# Patient Record
Sex: MALE | Race: WHITE | Employment: STUDENT | URBAN - METROPOLITAN AREA
[De-identification: names, ages, dates, MRNs, and addresses within clinical notes are randomized per-mention and may not be internally consistent; named-entity substitution may affect disease eponyms.]

---
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
## 2020-01-22 NOTE — PROGRESS NOTES
HPI:    Patient ID: Miguel Juarez is a 23year old male. HPI  Darryle Bracken Arvjacobo Kalpesh is a 23year old male.   HPI:   Here for f/u on meds    Overall OK w meds  Out of meds for 3 weeks   Can tell the difference       No issues w it while on Outpatient Medications   Medication Sig Dispense Refill   • ADDERALL XR 20 MG Oral Capsule SR 24 Hr Take 1 capsule (20 mg total) by mouth daily.  30 capsule 0     Allergies:No Known Allergies   PHYSICAL EXAM:   Physical Exam           ASSESSMENT/PLAN:   Att

## 2020-02-07 RX ORDER — DEXTROAMPHETAMINE SACCHARATE, AMPHETAMINE ASPARTATE MONOHYDRATE, DEXTROAMPHETAMINE SULFATE AND AMPHETAMINE SULFATE 5; 5; 5; 5 MG/1; MG/1; MG/1; MG/1
20 CAPSULE, EXTENDED RELEASE ORAL DAILY
Qty: 30 CAPSULE | Refills: 0 | Status: SHIPPED | OUTPATIENT
Start: 2020-04-21 | End: 2020-05-21

## 2020-02-07 RX ORDER — DEXTROAMPHETAMINE SACCHARATE, AMPHETAMINE ASPARTATE MONOHYDRATE, DEXTROAMPHETAMINE SULFATE AND AMPHETAMINE SULFATE 5; 5; 5; 5 MG/1; MG/1; MG/1; MG/1
20 CAPSULE, EXTENDED RELEASE ORAL DAILY
Qty: 30 CAPSULE | Refills: 0 | Status: SHIPPED | OUTPATIENT
Start: 2020-03-22 | End: 2020-04-21

## 2020-02-07 RX ORDER — DEXTROAMPHETAMINE SACCHARATE, AMPHETAMINE ASPARTATE MONOHYDRATE, DEXTROAMPHETAMINE SULFATE AND AMPHETAMINE SULFATE 5; 5; 5; 5 MG/1; MG/1; MG/1; MG/1
20 CAPSULE, EXTENDED RELEASE ORAL DAILY
Qty: 30 CAPSULE | Refills: 0 | Status: SHIPPED | OUTPATIENT
Start: 2020-02-22 | End: 2020-03-23

## 2020-03-27 ENCOUNTER — TELEPHONE (OUTPATIENT)
Dept: INTERNAL MEDICINE CLINIC | Facility: CLINIC | Age: 20
End: 2020-03-27

## 2020-03-27 NOTE — TELEPHONE ENCOUNTER
12:44 pm Spoke with Pt's mother. She is unable to write down phone numbers and names at this time because she is driving and will call us back for them. Pt can self refer thru [facility] [phone] or thru [facility] [phone].

## 2020-03-27 NOTE — TELEPHONE ENCOUNTER
Patient's mother called requesting for referral for patient to see therapist. Possibly get started on antidepressants. Please advise.

## 2020-06-01 NOTE — TELEPHONE ENCOUNTER
Refill needed [facility] #[id number]    Due for OV July - mother will call to schedule at later time    ADDERALL XR 20 MG Oral Capsule SR 24 Hr

## 2020-06-04 NOTE — TELEPHONE ENCOUNTER
ADDERALL XR 20 MG Oral Capsule SR 24 Hr    LOV: 1/22/2020   RTC: 6 months   Upcoming OV: none scheduled   Filled: 1/22/2020 #30, 0 refills

## 2020-06-05 RX ORDER — DEXTROAMPHETAMINE SULFATE, DEXTROAMPHETAMINE SACCHARATE, AMPHETAMINE SULFATE AND AMPHETAMINE ASPARTATE 5; 5; 5; 5 MG/1; MG/1; MG/1; MG/1
20 CAPSULE, EXTENDED RELEASE ORAL DAILY
Qty: 30 CAPSULE | Refills: 0 | Status: SHIPPED | OUTPATIENT
Start: 2020-06-05 | End: 2020-07-31

## 2020-07-31 RX ORDER — DEXTROAMPHETAMINE SULFATE, DEXTROAMPHETAMINE SACCHARATE, AMPHETAMINE SULFATE AND AMPHETAMINE ASPARTATE 5; 5; 5; 5 MG/1; MG/1; MG/1; MG/1
20 CAPSULE, EXTENDED RELEASE ORAL DAILY
Qty: 30 CAPSULE | Refills: 0 | Status: SHIPPED | OUTPATIENT
Start: 2020-07-31 | End: 2020-08-21

## 2020-07-31 NOTE — TELEPHONE ENCOUNTER
Mom called and is requesting a refill on her son's prescription for ADDERALL XR 20 MG Oral Capsule SR 24 Hr. Needs to be sent to [address] [place] [address] [address], [address] [facility]  [address] [phone], [phone]. Please advise.

## 2020-08-21 NOTE — PROGRESS NOTES
Erick Nazario is a 21year old male.   HPI:   Patient has agreed to do a doximity video encounter w me today in lieu of a regular appointment in regards to his ADD   Taking the med daily    Doing well w it    Has noticed that at work that he get

## 2020-09-08 ENCOUNTER — TELEPHONE (OUTPATIENT)
Dept: INTERNAL MEDICINE CLINIC | Facility: CLINIC | Age: 20
End: 2020-09-08

## 2020-09-08 DIAGNOSIS — Z20.828 EXPOSURE TO SARS-ASSOCIATED CORONAVIRUS: Primary | ICD-10-CM

## 2020-09-08 NOTE — TELEPHONE ENCOUNTER
Pt's father requesting COVID testing for pt. Pt's mother started with symptoms last Thursday and received positive results yesterday. Father being tested today and requesting order be placed for pt today. Pt with no current symptoms of COVID.

## 2020-09-08 NOTE — TELEPHONE ENCOUNTER
Pt's father aware test placed in the system. 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

## 2020-09-08 NOTE — TELEPHONE ENCOUNTER
Since Pt has no symptoms, it would be ok to work as long as he wears his mask, wash hands, social distance etc.

## 2020-09-09 ENCOUNTER — APPOINTMENT (OUTPATIENT)
Dept: LAB | Age: 20
End: 2020-09-09
Attending: FAMILY MEDICINE
Payer: COMMERCIAL

## 2020-09-09 DIAGNOSIS — Z20.828 EXPOSURE TO SARS-ASSOCIATED CORONAVIRUS: ICD-10-CM

## 2020-09-12 ENCOUNTER — TELEPHONE (OUTPATIENT)
Dept: FAMILY MEDICINE CLINIC | Facility: CLINIC | Age: 20
End: 2020-09-12

## 2020-09-12 NOTE — TELEPHONE ENCOUNTER
Received call from patient's mother requesting COVID test results. Discussed results are not available yet, test is still in progress. Mother wanted to know work status.  I instructed if she and her  are COVID (+) and patient is living in home with t

## 2020-09-14 ENCOUNTER — TELEPHONE (OUTPATIENT)
Dept: INTERNAL MEDICINE CLINIC | Facility: CLINIC | Age: 20
End: 2020-09-14

## 2020-09-14 LAB — SARS-COV-2 BY PCR: NOT DETECTED

## 2020-09-14 NOTE — TELEPHONE ENCOUNTER
Patient's mother calling to ask if Azell Landau should get re test for COVID 19; within last week the entire household has tested positive for virus.  His test was taken 14 days ago and just came back negative result; should he retest? Should he be going to work wi

## 2020-09-14 NOTE — TELEPHONE ENCOUNTER
Spoke with patient's mother okay per HIPAA stating patient's work asking him to return to work, but he is living-isolating in a house full of people positive with COVID. Patient does not have any symptoms at this time. Please advise.

## 2020-09-14 NOTE — TELEPHONE ENCOUNTER
Spoke with patient's mother ok per HIPAA-notified patient should not be working at this time, no need for retesting. Mother verbalized understanding.

## 2020-09-14 NOTE — TELEPHONE ENCOUNTER
Left message on pt's mother ph to call our office back. Dr Larissa Pollock- would pt needs to be retested?

## 2020-09-15 NOTE — TELEPHONE ENCOUNTER
Spoke with patient's mother Antonio adams per HIPAA-asking when patient can return to work-he needs to provide return date to his work. Patient tested negative for COVID and is asymptomatic, but his siblings and mother are + for covid. Please advise.

## 2020-09-16 NOTE — TELEPHONE ENCOUNTER
LM on VM for patient's mother Gage adams per HIPAA-notified pt can go back tomorrow if still remains without s/s.  Gage Landry to call back with any further questions

## 2020-10-12 RX ORDER — DEXTROAMPHETAMINE SULFATE, DEXTROAMPHETAMINE SACCHARATE, AMPHETAMINE SULFATE AND AMPHETAMINE ASPARTATE 5; 5; 5; 5 MG/1; MG/1; MG/1; MG/1
CAPSULE, EXTENDED RELEASE ORAL
Qty: 30 CAPSULE | Refills: 0 | Status: SHIPPED | OUTPATIENT
Start: 2020-10-12 | End: 2020-10-23

## 2020-10-12 NOTE — TELEPHONE ENCOUNTER
Medication(s) to Refill:   Requested Prescriptions     Pending Prescriptions Disp Refills   • ADDERALL XR 20 MG Oral Capsule SR 24 Hr [Pharmacy Med Name: Adderall XR Oral Capsule Extended Release 24 Hour 20 MG] 30 capsule 0     Sig: TAKE 1 CAPSULE BY MOUTH

## 2020-10-23 RX ORDER — DEXTROAMPHETAMINE SULFATE, DEXTROAMPHETAMINE SACCHARATE, AMPHETAMINE SULFATE AND AMPHETAMINE ASPARTATE 5; 5; 5; 5 MG/1; MG/1; MG/1; MG/1
20 CAPSULE, EXTENDED RELEASE ORAL DAILY
Qty: 30 CAPSULE | Refills: 0 | Status: SHIPPED | OUTPATIENT
Start: 2020-10-23 | End: 2021-01-19

## 2020-10-23 NOTE — TELEPHONE ENCOUNTER
Pt's mother stated pt was only given a 30 day supply for the adderall on pt's last virtual visit, pt's mother stated it should off been for 90 days, she is asking if dr. Radha Alcaraz can please send a 80 day supply,  Pt's mother did make an appointment but thinks

## 2020-11-02 ENCOUNTER — TELEPHONE (OUTPATIENT)
Dept: INTERNAL MEDICINE CLINIC | Facility: CLINIC | Age: 20
End: 2020-11-02

## 2020-11-15 ENCOUNTER — IMMUNIZATION (OUTPATIENT)
Dept: URGENT CARE | Age: 20
End: 2020-11-15

## 2020-11-15 DIAGNOSIS — Z23 NEED FOR VACCINATION: Primary | ICD-10-CM

## 2020-11-15 PROCEDURE — X1165 SELF PAY INFLUENZA QUADRIVALENT SPLIT PRES FREE .5 ML VACCINE: HCPCS | Performed by: NURSE PRACTITIONER

## 2021-01-19 ENCOUNTER — OFFICE VISIT (OUTPATIENT)
Dept: INTERNAL MEDICINE CLINIC | Facility: CLINIC | Age: 21
End: 2021-01-19

## 2021-01-19 VITALS
DIASTOLIC BLOOD PRESSURE: 80 MMHG | HEIGHT: 70.75 IN | WEIGHT: 202 LBS | BODY MASS INDEX: 28.28 KG/M2 | SYSTOLIC BLOOD PRESSURE: 138 MMHG | OXYGEN SATURATION: 99 % | HEART RATE: 100 BPM | RESPIRATION RATE: 16 BRPM

## 2021-01-19 DIAGNOSIS — Z00.00 ROUTINE GENERAL MEDICAL EXAMINATION AT A HEALTH CARE FACILITY: Primary | ICD-10-CM

## 2021-01-19 DIAGNOSIS — F41.9 ANXIETY: ICD-10-CM

## 2021-01-19 DIAGNOSIS — F90.9 ATTENTION DEFICIT HYPERACTIVITY DISORDER (ADHD), UNSPECIFIED ADHD TYPE: ICD-10-CM

## 2021-01-19 DIAGNOSIS — Z23 NEED FOR TDAP VACCINATION: ICD-10-CM

## 2021-01-19 PROCEDURE — 99213 OFFICE O/P EST LOW 20 MIN: CPT | Performed by: NURSE PRACTITIONER

## 2021-01-19 PROCEDURE — 3079F DIAST BP 80-89 MM HG: CPT | Performed by: NURSE PRACTITIONER

## 2021-01-19 PROCEDURE — 99395 PREV VISIT EST AGE 18-39: CPT | Performed by: NURSE PRACTITIONER

## 2021-01-19 PROCEDURE — 3075F SYST BP GE 130 - 139MM HG: CPT | Performed by: NURSE PRACTITIONER

## 2021-01-19 PROCEDURE — 3008F BODY MASS INDEX DOCD: CPT | Performed by: NURSE PRACTITIONER

## 2021-01-19 RX ORDER — DEXTROAMPHETAMINE SACCHARATE, AMPHETAMINE ASPARTATE MONOHYDRATE, DEXTROAMPHETAMINE SULFATE AND AMPHETAMINE SULFATE 5; 5; 5; 5 MG/1; MG/1; MG/1; MG/1
20 CAPSULE, EXTENDED RELEASE ORAL DAILY
Qty: 30 CAPSULE | Refills: 0 | Status: SHIPPED | OUTPATIENT
Start: 2021-02-19 | End: 2021-03-21

## 2021-01-19 RX ORDER — DEXTROAMPHETAMINE SACCHARATE, AMPHETAMINE ASPARTATE MONOHYDRATE, DEXTROAMPHETAMINE SULFATE AND AMPHETAMINE SULFATE 5; 5; 5; 5 MG/1; MG/1; MG/1; MG/1
20 CAPSULE, EXTENDED RELEASE ORAL DAILY
Qty: 30 CAPSULE | Refills: 0 | Status: SHIPPED | OUTPATIENT
Start: 2021-01-19 | End: 2021-02-18

## 2021-01-19 RX ORDER — DEXTROAMPHETAMINE SACCHARATE, AMPHETAMINE ASPARTATE MONOHYDRATE, DEXTROAMPHETAMINE SULFATE AND AMPHETAMINE SULFATE 5; 5; 5; 5 MG/1; MG/1; MG/1; MG/1
20 CAPSULE, EXTENDED RELEASE ORAL DAILY
Qty: 30 CAPSULE | Refills: 0 | Status: SHIPPED | OUTPATIENT
Start: 2021-03-22 | End: 2021-04-21

## 2021-01-19 NOTE — PROGRESS NOTES
Yissel Martin is a 21year old male who presents for a complete physical exam.   HPI:   Pt complains of Patient returns for recheck of ADHD treatment. Has been using the stimulant medication on a regular basis.  Feels the medication has been hel 04/17/2000 07/25/2000 09/14/2004 07/08/2009      MMR                   06/15/2001  09/14/2004      Meningococcal-Menactra                          10/16/2017      Pneumococcal (Prevnar 13)                          07/05/2000 watch     REVIEW OF SYSTEMS:   GENERAL: feels well otherwise  SKIN: denies any new unusual skin lesions +history of tinea, not bothersome  EYES: + wears glasses   HEENT: denies nasal congestion, sinus pain or ST  LUNGS: denies shortness of breath with exer in 1 year.

## 2021-04-01 ENCOUNTER — IMMUNIZATION (OUTPATIENT)
Dept: LAB | Age: 21
End: 2021-04-01
Attending: HOSPITALIST
Payer: COMMERCIAL

## 2021-04-01 DIAGNOSIS — Z23 NEED FOR VACCINATION: Primary | ICD-10-CM

## 2021-04-01 PROCEDURE — 0001A SARSCOV2 VAC 30MCG/0.3ML IM: CPT

## 2021-04-22 ENCOUNTER — IMMUNIZATION (OUTPATIENT)
Dept: LAB | Age: 21
End: 2021-04-22
Attending: HOSPITALIST
Payer: COMMERCIAL

## 2021-04-22 DIAGNOSIS — Z23 NEED FOR VACCINATION: Primary | ICD-10-CM

## 2021-04-22 PROCEDURE — 0002A SARSCOV2 VAC 30MCG/0.3ML IM: CPT

## 2021-05-04 ENCOUNTER — OFFICE VISIT (OUTPATIENT)
Dept: INTERNAL MEDICINE CLINIC | Facility: CLINIC | Age: 21
End: 2021-05-04

## 2021-05-04 ENCOUNTER — TELEPHONE (OUTPATIENT)
Dept: INTERNAL MEDICINE CLINIC | Facility: CLINIC | Age: 21
End: 2021-05-04

## 2021-05-04 VITALS
BODY MASS INDEX: 28.94 KG/M2 | WEIGHT: 206.75 LBS | SYSTOLIC BLOOD PRESSURE: 126 MMHG | RESPIRATION RATE: 18 BRPM | DIASTOLIC BLOOD PRESSURE: 68 MMHG | HEART RATE: 115 BPM | TEMPERATURE: 98 F | HEIGHT: 70.75 IN | OXYGEN SATURATION: 99 %

## 2021-05-04 DIAGNOSIS — E66.3 OVERWEIGHT (BMI 25.0-29.9): Primary | ICD-10-CM

## 2021-05-04 DIAGNOSIS — F41.9 ANXIETY: ICD-10-CM

## 2021-05-04 DIAGNOSIS — F90.9 ATTENTION DEFICIT HYPERACTIVITY DISORDER (ADHD), UNSPECIFIED ADHD TYPE: ICD-10-CM

## 2021-05-04 DIAGNOSIS — Z11.3 ROUTINE SCREENING FOR STI (SEXUALLY TRANSMITTED INFECTION): ICD-10-CM

## 2021-05-04 DIAGNOSIS — Z23 NEED FOR HPV VACCINE: ICD-10-CM

## 2021-05-04 PROCEDURE — 3008F BODY MASS INDEX DOCD: CPT | Performed by: NURSE PRACTITIONER

## 2021-05-04 PROCEDURE — 3074F SYST BP LT 130 MM HG: CPT | Performed by: NURSE PRACTITIONER

## 2021-05-04 PROCEDURE — 99215 OFFICE O/P EST HI 40 MIN: CPT | Performed by: NURSE PRACTITIONER

## 2021-05-04 PROCEDURE — 3078F DIAST BP <80 MM HG: CPT | Performed by: NURSE PRACTITIONER

## 2021-05-04 RX ORDER — DEXTROAMPHETAMINE SACCHARATE, AMPHETAMINE ASPARTATE MONOHYDRATE, DEXTROAMPHETAMINE SULFATE AND AMPHETAMINE SULFATE 6.25; 6.25; 6.25; 6.25 MG/1; MG/1; MG/1; MG/1
25 CAPSULE, EXTENDED RELEASE ORAL EVERY MORNING
Qty: 30 CAPSULE | Refills: 0 | Status: SHIPPED | OUTPATIENT
Start: 2021-05-04 | End: 2021-06-08

## 2021-05-04 NOTE — PROGRESS NOTES
CHIEF COMPLAINT:     Patient presents with:  Medication Follow-Up  Weight Problem      HPI:   Alejandra Peraza is a 24year old male returns for recheck of ADD treatment. Has been using the stimulant medication on a regular basis.  Feels the medica Diagnosis Date   • Asperger syndrome       Social History:  Social History    Tobacco Use      Smoking status: Never Smoker      Smokeless tobacco: Never Used    Vaping Use      Vaping Use: Never used    Alcohol use: Not Currently      Alcohol/week: 0.0 type  - Amphetamine-Dextroamphet ER (ADDERALL XR) 25 MG Oral Capsule SR 24 Hr; Take 1 capsule (25 mg total) by mouth every morning. Dispense: 30 capsule; Refill: 0  Monitor HR.  Have mom who is an RN check HR and send message in the next month with reading

## 2021-05-04 NOTE — TELEPHONE ENCOUNTER
Pt called and scheduled appointment. Pt is out of medication.      Amphetamine-Dextroamphet ER (ADDERALL XR) 20 MG Oral Capsule SR 24 Hr 30 capsule         Future Appointments   Date Time Provider Heather Coulter   5/4/2021  1:00 PM Kassie Merchant

## 2021-05-30 DIAGNOSIS — F90.9 ATTENTION DEFICIT HYPERACTIVITY DISORDER (ADHD), UNSPECIFIED ADHD TYPE: ICD-10-CM

## 2021-06-01 RX ORDER — DEXTROAMPHETAMINE SACCHARATE, AMPHETAMINE ASPARTATE MONOHYDRATE, DEXTROAMPHETAMINE SULFATE AND AMPHETAMINE SULFATE 6.25; 6.25; 6.25; 6.25 MG/1; MG/1; MG/1; MG/1
25 CAPSULE, EXTENDED RELEASE ORAL EVERY MORNING
Qty: 30 CAPSULE | Refills: 0 | OUTPATIENT
Start: 2021-06-03 | End: 2021-07-03

## 2021-06-01 NOTE — TELEPHONE ENCOUNTER
Failed protocol     Last refill:  Amphetamine-Dextroamphet ER (ADDERALL XR) 25 MG Oral Capsule SR 24 Hr 30 capsule 0 5/4/2021    Sig:   Take 1 capsule (25 mg total) by mouth every morning. LOV:  5/4/2021 Janelle Adams RTC 3 months  4.  Attention def

## 2021-06-01 NOTE — TELEPHONE ENCOUNTER
Please have pt f/u regarding HR or reach out to discuss with mom if HR has been monitored as per last note.

## 2021-06-08 ENCOUNTER — OFFICE VISIT (OUTPATIENT)
Dept: INTERNAL MEDICINE CLINIC | Facility: CLINIC | Age: 21
End: 2021-06-08

## 2021-06-08 VITALS
BODY MASS INDEX: 27.13 KG/M2 | TEMPERATURE: 98 F | WEIGHT: 189.5 LBS | RESPIRATION RATE: 16 BRPM | DIASTOLIC BLOOD PRESSURE: 68 MMHG | HEIGHT: 70 IN | HEART RATE: 98 BPM | SYSTOLIC BLOOD PRESSURE: 116 MMHG

## 2021-06-08 DIAGNOSIS — F90.9 ATTENTION DEFICIT HYPERACTIVITY DISORDER (ADHD), UNSPECIFIED ADHD TYPE: ICD-10-CM

## 2021-06-08 PROCEDURE — 3074F SYST BP LT 130 MM HG: CPT | Performed by: NURSE PRACTITIONER

## 2021-06-08 PROCEDURE — 3008F BODY MASS INDEX DOCD: CPT | Performed by: NURSE PRACTITIONER

## 2021-06-08 PROCEDURE — 3078F DIAST BP <80 MM HG: CPT | Performed by: NURSE PRACTITIONER

## 2021-06-08 PROCEDURE — 99213 OFFICE O/P EST LOW 20 MIN: CPT | Performed by: NURSE PRACTITIONER

## 2021-06-08 RX ORDER — DEXTROAMPHETAMINE SACCHARATE, AMPHETAMINE ASPARTATE MONOHYDRATE, DEXTROAMPHETAMINE SULFATE AND AMPHETAMINE SULFATE 6.25; 6.25; 6.25; 6.25 MG/1; MG/1; MG/1; MG/1
25 CAPSULE, EXTENDED RELEASE ORAL DAILY
Qty: 30 CAPSULE | Refills: 0 | Status: SHIPPED | OUTPATIENT
Start: 2021-06-08 | End: 2021-07-08

## 2021-06-08 RX ORDER — DEXTROAMPHETAMINE SACCHARATE, AMPHETAMINE ASPARTATE MONOHYDRATE, DEXTROAMPHETAMINE SULFATE AND AMPHETAMINE SULFATE 6.25; 6.25; 6.25; 6.25 MG/1; MG/1; MG/1; MG/1
25 CAPSULE, EXTENDED RELEASE ORAL EVERY MORNING
Qty: 30 CAPSULE | Refills: 0 | Status: CANCELLED | OUTPATIENT
Start: 2021-06-08

## 2021-06-08 RX ORDER — DEXTROAMPHETAMINE SACCHARATE, AMPHETAMINE ASPARTATE MONOHYDRATE, DEXTROAMPHETAMINE SULFATE AND AMPHETAMINE SULFATE 6.25; 6.25; 6.25; 6.25 MG/1; MG/1; MG/1; MG/1
25 CAPSULE, EXTENDED RELEASE ORAL DAILY
Qty: 30 CAPSULE | Refills: 0 | Status: SHIPPED | OUTPATIENT
Start: 2021-07-09 | End: 2021-08-09

## 2021-06-08 RX ORDER — DEXTROAMPHETAMINE SACCHARATE, AMPHETAMINE ASPARTATE MONOHYDRATE, DEXTROAMPHETAMINE SULFATE AND AMPHETAMINE SULFATE 6.25; 6.25; 6.25; 6.25 MG/1; MG/1; MG/1; MG/1
25 CAPSULE, EXTENDED RELEASE ORAL DAILY
Qty: 30 CAPSULE | Refills: 0 | Status: SHIPPED | OUTPATIENT
Start: 2021-08-09 | End: 2021-08-09

## 2021-06-08 NOTE — PROGRESS NOTES
CHIEF COMPLAINT:     Patient presents with:  Medication Follow-Up: Refills needed. Pt is requesting lower dose of the 25 mg      HPI:   Trang Molina is a 24year old male who returns for recheck of ADHD treatment.  Dose increased from 20 mg to GENERAL: Denies fever, chills, weight change, decreased appetite  EYES: Denies blurred vision or double vision  CHEST: Denies chest pain, or palpitations  LUNGS: Denies shortness of breath, cough, or wheezing  GI: Denies abdominal pain, N/V/C/D.    NEURO:

## 2021-07-04 ENCOUNTER — HOSPITAL ENCOUNTER (EMERGENCY)
Facility: HOSPITAL | Age: 21
Discharge: HOME | End: 2021-07-04
Attending: EMERGENCY MEDICINE
Payer: COMMERCIAL

## 2021-07-04 ENCOUNTER — APPOINTMENT (OUTPATIENT)
Dept: CT IMAGING | Facility: HOSPITAL | Age: 21
End: 2021-07-04
Attending: EMERGENCY MEDICINE
Payer: COMMERCIAL

## 2021-07-04 VITALS
SYSTOLIC BLOOD PRESSURE: 154 MMHG | DIASTOLIC BLOOD PRESSURE: 89 MMHG | HEART RATE: 92 BPM | BODY MASS INDEX: 25.76 KG/M2 | RESPIRATION RATE: 16 BRPM | OXYGEN SATURATION: 99 % | HEIGHT: 71 IN | TEMPERATURE: 98 F | WEIGHT: 184 LBS

## 2021-07-04 DIAGNOSIS — R20.2 PARESTHESIAS: Primary | ICD-10-CM

## 2021-07-04 LAB
ALBUMIN SERPL-MCNC: 4.5 G/DL (ref 3.4–5)
ALBUMIN/GLOB SERPL: 1.5 {RATIO} (ref 1–2)
ALP LIVER SERPL-CCNC: 70 U/L
ALT SERPL-CCNC: 33 U/L
ANION GAP SERPL CALC-SCNC: 5 MMOL/L (ref 0–18)
AST SERPL-CCNC: 20 U/L (ref 15–37)
BASOPHILS # BLD AUTO: 0.01 X10(3) UL (ref 0–0.2)
BASOPHILS NFR BLD AUTO: 0.3 %
BILIRUB SERPL-MCNC: 0.6 MG/DL (ref 0.1–2)
BUN BLD-MCNC: 12 MG/DL (ref 7–18)
BUN/CREAT SERPL: 13.8 (ref 10–20)
CALCIUM BLD-MCNC: 9 MG/DL (ref 8.5–10.1)
CHLORIDE SERPL-SCNC: 107 MMOL/L (ref 98–112)
CO2 SERPL-SCNC: 27 MMOL/L (ref 21–32)
CREAT BLD-MCNC: 0.87 MG/DL
DEPRECATED RDW RBC AUTO: 37.9 FL (ref 35.1–46.3)
EOSINOPHIL # BLD AUTO: 0.05 X10(3) UL (ref 0–0.7)
EOSINOPHIL NFR BLD AUTO: 1.6 %
ERYTHROCYTE [DISTWIDTH] IN BLOOD BY AUTOMATED COUNT: 12.2 % (ref 11–15)
GLOBULIN PLAS-MCNC: 3.1 G/DL (ref 2.8–4.4)
GLUCOSE BLD-MCNC: 102 MG/DL (ref 70–99)
HCT VFR BLD AUTO: 45.9 %
HGB BLD-MCNC: 16.3 G/DL
IMM GRANULOCYTES # BLD AUTO: 0 X10(3) UL (ref 0–1)
IMM GRANULOCYTES NFR BLD: 0 %
LYMPHOCYTES # BLD AUTO: 1.05 X10(3) UL (ref 1–4)
LYMPHOCYTES NFR BLD AUTO: 32.9 %
M PROTEIN MFR SERPL ELPH: 7.6 G/DL (ref 6.4–8.2)
MCH RBC QN AUTO: 30.5 PG (ref 26–34)
MCHC RBC AUTO-ENTMCNC: 35.5 G/DL (ref 31–37)
MCV RBC AUTO: 85.8 FL
MONOCYTES # BLD AUTO: 0.28 X10(3) UL (ref 0.1–1)
MONOCYTES NFR BLD AUTO: 8.8 %
NEUTROPHILS # BLD AUTO: 1.8 X10 (3) UL (ref 1.5–7.7)
NEUTROPHILS # BLD AUTO: 1.8 X10(3) UL (ref 1.5–7.7)
NEUTROPHILS NFR BLD AUTO: 56.4 %
OSMOLALITY SERPL CALC.SUM OF ELEC: 288 MOSM/KG (ref 275–295)
PLATELET # BLD AUTO: 230 10(3)UL (ref 150–450)
POTASSIUM SERPL-SCNC: 3.6 MMOL/L (ref 3.5–5.1)
RBC # BLD AUTO: 5.35 X10(6)UL
SODIUM SERPL-SCNC: 139 MMOL/L (ref 136–145)
WBC # BLD AUTO: 3.2 X10(3) UL (ref 4–11)

## 2021-07-04 PROCEDURE — 93005 ELECTROCARDIOGRAM TRACING: CPT

## 2021-07-04 PROCEDURE — 99285 EMERGENCY DEPT VISIT HI MDM: CPT

## 2021-07-04 PROCEDURE — 80053 COMPREHEN METABOLIC PANEL: CPT | Performed by: EMERGENCY MEDICINE

## 2021-07-04 PROCEDURE — 85025 COMPLETE CBC W/AUTO DIFF WBC: CPT | Performed by: EMERGENCY MEDICINE

## 2021-07-04 PROCEDURE — 93010 ELECTROCARDIOGRAM REPORT: CPT

## 2021-07-04 PROCEDURE — 96374 THER/PROPH/DIAG INJ IV PUSH: CPT

## 2021-07-04 PROCEDURE — 70450 CT HEAD/BRAIN W/O DYE: CPT | Performed by: EMERGENCY MEDICINE

## 2021-07-04 PROCEDURE — 96375 TX/PRO/DX INJ NEW DRUG ADDON: CPT

## 2021-07-04 PROCEDURE — 96361 HYDRATE IV INFUSION ADD-ON: CPT

## 2021-07-04 RX ORDER — METOCLOPRAMIDE HYDROCHLORIDE 5 MG/ML
10 INJECTION INTRAMUSCULAR; INTRAVENOUS ONCE
Status: COMPLETED | OUTPATIENT
Start: 2021-07-04 | End: 2021-07-04

## 2021-07-04 RX ORDER — ASPIRIN 81 MG/1
81 TABLET, CHEWABLE ORAL ONCE
Status: COMPLETED | OUTPATIENT
Start: 2021-07-04 | End: 2021-07-04

## 2021-07-04 RX ORDER — DIPHENHYDRAMINE HYDROCHLORIDE 50 MG/ML
25 INJECTION INTRAMUSCULAR; INTRAVENOUS ONCE
Status: COMPLETED | OUTPATIENT
Start: 2021-07-04 | End: 2021-07-04

## 2021-07-04 NOTE — ED PROVIDER NOTES
Patient Seen in: [facility] Emergency Department      History   Patient presents with:  Numbness Weakness    Stated Complaint: numbness in left hand that started this morning.  took a higher dose of ADHD med*    HPI/Subjective:   HPI    This is a 24- Exam  General: . Patient seems slightly anxious. He has no pronator drift he has no facial asymmetry he has normal finger-to-nose his cranial nerves grossly intact.   He has subjective sense sensation of feeling tingling in his lower extremities but he is CULTURE REFLEX   RAINBOW DRAW LAVENDER   RAINBOW DRAW LIGHT GREEN   RAINBOW DRAW GOLD               The patient was placed on monitors, IV was started, blood was drawn.          MDM      Clinically I do feel that this in a score is 0 as he has subjective nu 3.2.  The patient comprehensive was grossly normal.  The patient did get Reglan, Benadryl, IV fluids. The patient was reexamined at 1:15 PM.      On repeat exam his symptoms are completely resolved.   The patient had I was given Reglan Benadryl and p

## 2021-07-04 NOTE — ED INITIAL ASSESSMENT (HPI)
PT reports feeling numb below L knee and L arm around 1130. PT also reports feeling dizzy and numbness went from L arm to R arm. Currently PT reports symptoms have improved.

## 2021-07-06 LAB
ATRIAL RATE: 88 BPM
P AXIS: 81 DEGREES
P-R INTERVAL: 164 MS
Q-T INTERVAL: 342 MS
QRS DURATION: 96 MS
QTC CALCULATION (BEZET): 413 MS
R AXIS: 92 DEGREES
T AXIS: 50 DEGREES
VENTRICULAR RATE: 88 BPM

## 2021-08-09 ENCOUNTER — OFFICE VISIT (OUTPATIENT)
Dept: INTERNAL MEDICINE CLINIC | Facility: CLINIC | Age: 21
End: 2021-08-09

## 2021-08-09 VITALS
RESPIRATION RATE: 20 BRPM | DIASTOLIC BLOOD PRESSURE: 80 MMHG | SYSTOLIC BLOOD PRESSURE: 142 MMHG | TEMPERATURE: 98 F | OXYGEN SATURATION: 96 % | HEART RATE: 90 BPM | HEIGHT: 71 IN | BODY MASS INDEX: 25.83 KG/M2 | WEIGHT: 184.5 LBS

## 2021-08-09 DIAGNOSIS — F90.9 ATTENTION DEFICIT HYPERACTIVITY DISORDER (ADHD), UNSPECIFIED ADHD TYPE: ICD-10-CM

## 2021-08-09 PROCEDURE — 3079F DIAST BP 80-89 MM HG: CPT | Performed by: FAMILY MEDICINE

## 2021-08-09 PROCEDURE — 3077F SYST BP >= 140 MM HG: CPT | Performed by: FAMILY MEDICINE

## 2021-08-09 PROCEDURE — 99213 OFFICE O/P EST LOW 20 MIN: CPT | Performed by: FAMILY MEDICINE

## 2021-08-09 PROCEDURE — 3008F BODY MASS INDEX DOCD: CPT | Performed by: FAMILY MEDICINE

## 2021-08-09 RX ORDER — DEXTROAMPHETAMINE SACCHARATE, AMPHETAMINE ASPARTATE MONOHYDRATE, DEXTROAMPHETAMINE SULFATE AND AMPHETAMINE SULFATE 6.25; 6.25; 6.25; 6.25 MG/1; MG/1; MG/1; MG/1
25 CAPSULE, EXTENDED RELEASE ORAL EVERY MORNING
Qty: 30 CAPSULE | Refills: 0 | Status: SHIPPED | OUTPATIENT
Start: 2021-09-09 | End: 2021-12-10

## 2021-08-09 RX ORDER — DEXTROAMPHETAMINE SACCHARATE, AMPHETAMINE ASPARTATE MONOHYDRATE, DEXTROAMPHETAMINE SULFATE AND AMPHETAMINE SULFATE 6.25; 6.25; 6.25; 6.25 MG/1; MG/1; MG/1; MG/1
25 CAPSULE, EXTENDED RELEASE ORAL DAILY
Qty: 30 CAPSULE | Refills: 0 | Status: SHIPPED | OUTPATIENT
Start: 2021-08-09 | End: 2021-09-09

## 2021-08-09 RX ORDER — DEXTROAMPHETAMINE SACCHARATE, AMPHETAMINE ASPARTATE MONOHYDRATE, DEXTROAMPHETAMINE SULFATE AND AMPHETAMINE SULFATE 6.25; 6.25; 6.25; 6.25 MG/1; MG/1; MG/1; MG/1
25 CAPSULE, EXTENDED RELEASE ORAL DAILY
Qty: 30 CAPSULE | Refills: 0 | Status: SHIPPED | OUTPATIENT
Start: 2021-10-09 | End: 2021-11-08

## 2021-08-09 RX ORDER — DEXTROAMPHETAMINE SACCHARATE, AMPHETAMINE ASPARTATE MONOHYDRATE, DEXTROAMPHETAMINE SULFATE AND AMPHETAMINE SULFATE 6.25; 6.25; 6.25; 6.25 MG/1; MG/1; MG/1; MG/1
25 CAPSULE, EXTENDED RELEASE ORAL DAILY
Qty: 30 CAPSULE | Refills: 0 | Status: SHIPPED | OUTPATIENT
Start: 2021-09-09 | End: 2021-08-09

## 2021-08-09 NOTE — PROGRESS NOTES
Alejandra Peraza is a 24year old male.   HPI:   Here for med ck  Taking meds as directed    Feels 25mg is the right amount    Works well    No CP   Breathing OK    no shakes   Sleeps OK   Works at [employer] [place]   [address] [facility] 25 MG Oral Capsule SR 24 Hr,         DISCONTINUED: Amphetamine-Dextroamphet ER         (ADDERALL XR) 25 MG Oral Capsule SR 24 Hr        Doing well w meds   CPM w current dose    Monae 6 mo       The patient indicates understanding of these issues and agrees

## 2021-08-26 ENCOUNTER — TELEPHONE (OUTPATIENT)
Dept: INTERNAL MEDICINE CLINIC | Facility: CLINIC | Age: 21
End: 2021-08-26

## 2021-08-26 NOTE — TELEPHONE ENCOUNTER
Patient's mother called stating that he will be getting a drug screening for his employer. Requesting letter stating that patient is taking adderall.

## 2021-08-31 ENCOUNTER — EMPLOYEE HEALTH (OUTPATIENT)
Dept: OTHER | Facility: HOSPITAL | Age: 21
End: 2021-08-31
Attending: PREVENTIVE MEDICINE

## 2021-08-31 DIAGNOSIS — Z11.1 SCREENING-PULMONARY TB: Primary | ICD-10-CM

## 2021-08-31 PROCEDURE — 86480 TB TEST CELL IMMUN MEASURE: CPT

## 2021-09-02 LAB
M TB IFN-G CD4+ T-CELLS BLD-ACNC: 0.04 IU/ML
M TB TUBERC IFN-G BLD QL: NEGATIVE
M TB TUBERC IGNF/MITOGEN IGNF CONTROL: >10 IU/ML
QFT TB1 AG MINUS NIL: 0.01 IU/ML
QFT TB2 AG MINUS NIL: 0.01 IU/ML

## 2021-11-08 ENCOUNTER — TELEMEDICINE (OUTPATIENT)
Dept: INTERNAL MEDICINE CLINIC | Facility: CLINIC | Age: 21
End: 2021-11-08

## 2021-11-08 VITALS
HEART RATE: 60 BPM | RESPIRATION RATE: 18 BRPM | TEMPERATURE: 98 F | DIASTOLIC BLOOD PRESSURE: 80 MMHG | HEIGHT: 71 IN | BODY MASS INDEX: 25.93 KG/M2 | SYSTOLIC BLOOD PRESSURE: 128 MMHG | WEIGHT: 185.19 LBS | OXYGEN SATURATION: 96 %

## 2021-11-08 DIAGNOSIS — F90.9 ATTENTION DEFICIT HYPERACTIVITY DISORDER (ADHD), UNSPECIFIED ADHD TYPE: ICD-10-CM

## 2021-11-08 PROCEDURE — 3074F SYST BP LT 130 MM HG: CPT | Performed by: FAMILY MEDICINE

## 2021-11-08 PROCEDURE — 99213 OFFICE O/P EST LOW 20 MIN: CPT | Performed by: FAMILY MEDICINE

## 2021-11-08 PROCEDURE — 3079F DIAST BP 80-89 MM HG: CPT | Performed by: FAMILY MEDICINE

## 2021-11-08 PROCEDURE — 3008F BODY MASS INDEX DOCD: CPT | Performed by: FAMILY MEDICINE

## 2021-11-08 RX ORDER — DEXTROAMPHETAMINE SACCHARATE, AMPHETAMINE ASPARTATE MONOHYDRATE, DEXTROAMPHETAMINE SULFATE AND AMPHETAMINE SULFATE 6.25; 6.25; 6.25; 6.25 MG/1; MG/1; MG/1; MG/1
25 CAPSULE, EXTENDED RELEASE ORAL DAILY
Qty: 30 CAPSULE | Refills: 0 | Status: SHIPPED | OUTPATIENT
Start: 2021-11-08 | End: 2021-12-08

## 2021-11-08 NOTE — PROGRESS NOTES
Laura Ocasio is a 24year old male.   HPI:   Here for f/u on the meds   The adderall is working well at this dose    25mg  No palptations   np tremors      Works well he feels   Ran out 2 d ago and could tell the difference       Current Outpat

## 2021-12-10 RX ORDER — DEXTROAMPHETAMINE SULFATE, DEXTROAMPHETAMINE SACCHARATE, AMPHETAMINE SULFATE AND AMPHETAMINE ASPARTATE 6.25; 6.25; 6.25; 6.25 MG/1; MG/1; MG/1; MG/1
CAPSULE, EXTENDED RELEASE ORAL
Qty: 30 CAPSULE | Refills: 0 | Status: SHIPPED | OUTPATIENT
Start: 2021-12-10 | End: 2022-01-08

## 2021-12-10 NOTE — TELEPHONE ENCOUNTER
Name from pharmacy: Adderall XR Oral Capsule Extended Release 24 Hour 25 MG          Will file in chart as: ADDERALL XR 25 MG Oral Capsule SR 24 Hr    Sig: TAKE 1 CAPSULE BY MOUTH EVERY DAY    Disp:  30 capsule    Refills:  0    Start: 12/10/2021    Rd [place]

## 2022-01-07 NOTE — TELEPHONE ENCOUNTER
Pt mom requesting refill on behalf of pt. Per pt mom, pt only has 3 days left of medication.      ADDERALL XR 25 MG Oral Capsule SR 24 Hr    [id number] - [place], [place] - [facility] [address] [phone], [phone]

## 2022-01-08 RX ORDER — DEXTROAMPHETAMINE SACCHARATE, AMPHETAMINE ASPARTATE MONOHYDRATE, DEXTROAMPHETAMINE SULFATE AND AMPHETAMINE SULFATE 6.25; 6.25; 6.25; 6.25 MG/1; MG/1; MG/1; MG/1
25 CAPSULE, EXTENDED RELEASE ORAL DAILY
Qty: 30 CAPSULE | Refills: 0 | Status: SHIPPED | OUTPATIENT
Start: 2022-01-08

## 2022-01-08 NOTE — TELEPHONE ENCOUNTER
Pt mom requesting refill on behalf of pt. Per pt mom, pt only has 3 days left of medication. Amphetamine-Dextroamphet ER (ADDERALL XR) 25 MG Oral Capsule SR 24 Hr          Sig: Take 1 capsule (25 mg total) by mouth daily.     Disp:  30 capsule    Refills

## 2022-01-21 NOTE — TELEPHONE ENCOUNTER
Pt's mother called office and is requesting recommendations for pt to be seen by a phycologist. Please advise. Best call back number for mother is [phone].

## 2022-02-10 RX ORDER — DEXTROAMPHETAMINE SACCHARATE, AMPHETAMINE ASPARTATE MONOHYDRATE, DEXTROAMPHETAMINE SULFATE AND AMPHETAMINE SULFATE 6.25; 6.25; 6.25; 6.25 MG/1; MG/1; MG/1; MG/1
25 CAPSULE, EXTENDED RELEASE ORAL DAILY
Qty: 30 CAPSULE | Refills: 0 | Status: SHIPPED | OUTPATIENT
Start: 2022-02-10 | End: 2022-03-14

## 2022-03-14 RX ORDER — DEXTROAMPHETAMINE SACCHARATE, AMPHETAMINE ASPARTATE MONOHYDRATE, DEXTROAMPHETAMINE SULFATE AND AMPHETAMINE SULFATE 6.25; 6.25; 6.25; 6.25 MG/1; MG/1; MG/1; MG/1
CAPSULE, EXTENDED RELEASE ORAL
Qty: 30 CAPSULE | Refills: 0 | Status: SHIPPED | OUTPATIENT
Start: 2022-03-14

## 2022-04-07 RX ORDER — DEXTROAMPHETAMINE SACCHARATE, AMPHETAMINE ASPARTATE MONOHYDRATE, DEXTROAMPHETAMINE SULFATE AND AMPHETAMINE SULFATE 6.25; 6.25; 6.25; 6.25 MG/1; MG/1; MG/1; MG/1
25 CAPSULE, EXTENDED RELEASE ORAL DAILY
Qty: 30 CAPSULE | Refills: 0 | Status: SHIPPED | OUTPATIENT
Start: 2022-04-07

## 2022-04-07 NOTE — TELEPHONE ENCOUNTER
Pts mother called requesting refill for the pt:    AMPHETAMINE-DEXTROAMPHET ER 25 MG Oral Capsule  [id number] - [place], [place] - [address] [phone], [phone]    Future Appointments   Date Time Provider Heather Coulter   4/8/2022  9:30 AM Bridget Medrano MD [facility] [place]

## 2022-05-13 RX ORDER — DEXTROAMPHETAMINE SACCHARATE, AMPHETAMINE ASPARTATE MONOHYDRATE, DEXTROAMPHETAMINE SULFATE AND AMPHETAMINE SULFATE 6.25; 6.25; 6.25; 6.25 MG/1; MG/1; MG/1; MG/1
25 CAPSULE, EXTENDED RELEASE ORAL DAILY
Qty: 30 CAPSULE | Refills: 0 | Status: SHIPPED | OUTPATIENT
Start: 2022-05-13

## 2022-06-07 NOTE — TELEPHONE ENCOUNTER
Amphetamine-Dextroamphet ER 25 mg  Filled 5-13-22  Qty 30  0 refills  No upcoming appt.    LOV 4-8-22
Head atraumatic, normal cephalic shape.

## 2022-06-08 RX ORDER — DEXTROAMPHETAMINE SACCHARATE, AMPHETAMINE ASPARTATE MONOHYDRATE, DEXTROAMPHETAMINE SULFATE AND AMPHETAMINE SULFATE 6.25; 6.25; 6.25; 6.25 MG/1; MG/1; MG/1; MG/1
CAPSULE, EXTENDED RELEASE ORAL
Qty: 30 CAPSULE | Refills: 0 | Status: SHIPPED | OUTPATIENT
Start: 2022-06-08

## 2022-07-14 RX ORDER — DEXTROAMPHETAMINE SACCHARATE, AMPHETAMINE ASPARTATE MONOHYDRATE, DEXTROAMPHETAMINE SULFATE AND AMPHETAMINE SULFATE 6.25; 6.25; 6.25; 6.25 MG/1; MG/1; MG/1; MG/1
25 CAPSULE, EXTENDED RELEASE ORAL DAILY
Qty: 30 CAPSULE | Refills: 0 | Status: SHIPPED | OUTPATIENT
Start: 2022-07-14

## 2022-08-14 RX ORDER — DEXTROAMPHETAMINE SACCHARATE, AMPHETAMINE ASPARTATE MONOHYDRATE, DEXTROAMPHETAMINE SULFATE AND AMPHETAMINE SULFATE 6.25; 6.25; 6.25; 6.25 MG/1; MG/1; MG/1; MG/1
25 CAPSULE, EXTENDED RELEASE ORAL DAILY
Qty: 30 CAPSULE | Refills: 0 | Status: SHIPPED | OUTPATIENT
Start: 2022-08-14

## 2022-08-17 ENCOUNTER — TELEPHONE (OUTPATIENT)
Dept: INTERNAL MEDICINE CLINIC | Facility: CLINIC | Age: 22
End: 2022-08-17

## 2022-09-14 RX ORDER — DEXTROAMPHETAMINE SACCHARATE, AMPHETAMINE ASPARTATE MONOHYDRATE, DEXTROAMPHETAMINE SULFATE AND AMPHETAMINE SULFATE 6.25; 6.25; 6.25; 6.25 MG/1; MG/1; MG/1; MG/1
25 CAPSULE, EXTENDED RELEASE ORAL DAILY
Qty: 30 CAPSULE | Refills: 0 | Status: SHIPPED | OUTPATIENT
Start: 2022-09-14

## 2022-09-14 NOTE — TELEPHONE ENCOUNTER
Amphetamine-dextroamphet ER 25 mg  Filled 8-14-22  Qty 30  0 refills  No upcoming appt.   LOV 4-8-22

## 2022-09-15 ENCOUNTER — TELEPHONE (OUTPATIENT)
Dept: INTERNAL MEDICINE CLINIC | Facility: CLINIC | Age: 22
End: 2022-09-15

## 2024-02-28 ENCOUNTER — HOSPITAL ENCOUNTER (OUTPATIENT)
Age: 24
Discharge: HOME | End: 2024-02-28
Payer: COMMERCIAL

## 2024-02-28 VITALS
HEART RATE: 85 BPM | SYSTOLIC BLOOD PRESSURE: 131 MMHG | HEIGHT: 71 IN | TEMPERATURE: 98 F | WEIGHT: 206 LBS | DIASTOLIC BLOOD PRESSURE: 77 MMHG | BODY MASS INDEX: 28.84 KG/M2 | RESPIRATION RATE: 16 BRPM | OXYGEN SATURATION: 96 %

## 2024-02-28 DIAGNOSIS — L03.213 PRESEPTAL CELLULITIS OF LEFT EYE: Primary | ICD-10-CM

## 2024-02-28 PROCEDURE — 99214 OFFICE O/P EST MOD 30 MIN: CPT

## 2024-02-28 PROCEDURE — 99213 OFFICE O/P EST LOW 20 MIN: CPT

## 2024-02-28 RX ORDER — ERYTHROMYCIN 5 MG/G
1 OINTMENT OPHTHALMIC EVERY 6 HOURS
Qty: 1 EACH | Refills: 0 | Status: SHIPPED | OUTPATIENT
Start: 2024-02-28 | End: 2024-03-06

## 2024-02-28 RX ORDER — BUPROPION HYDROCHLORIDE 150 MG/1
150 TABLET ORAL DAILY
COMMUNITY
Start: 2024-01-25

## 2024-02-28 RX ORDER — ERYTHROMYCIN 5 MG/G
1 OINTMENT OPHTHALMIC EVERY 6 HOURS
Qty: 1 EACH | Refills: 0 | Status: SHIPPED | OUTPATIENT
Start: 2024-02-28 | End: 2024-02-28

## 2024-02-28 RX ORDER — AMOXICILLIN AND CLAVULANATE POTASSIUM 875; 125 MG/1; MG/1
1 TABLET, FILM COATED ORAL 2 TIMES DAILY
Qty: 20 TABLET | Refills: 0 | Status: SHIPPED | OUTPATIENT
Start: 2024-02-28 | End: 2024-03-09

## 2024-02-28 RX ORDER — AMOXICILLIN AND CLAVULANATE POTASSIUM 875; 125 MG/1; MG/1
1 TABLET, FILM COATED ORAL 2 TIMES DAILY
Qty: 20 TABLET | Refills: 0 | Status: SHIPPED | OUTPATIENT
Start: 2024-02-28 | End: 2024-02-28

## 2024-02-28 NOTE — DISCHARGE INSTRUCTIONS
Use antibiotics as directed.  Follow-up with ophthalmology for recheck.  Go to the emergency room with any fever, pain with movements of your eyes, or swelling of the eye.

## 2024-02-28 NOTE — ED PROVIDER NOTES
Patient Seen in: [facility]      History     Chief Complaint   Patient presents with    Eye Problem     Stated Complaint: Eye irritation    Subjective:   HPI  23-year-old with ADHD, Asperger's, and retinoschisis presents to the immediate care complaining of left eye redness, drainage, and eyelid redness and swelling for the past 2 days.  Started after he used his girlfriends eyeliner.  He states he possibly also put his glasses down during a class and they became contaminated.  He denies any visual disturbances.  He denies any significant eye pain.  There is yellow drainage.  He denies any cough or cold symptoms.  He denies any pain with eye movements.    Objective:   Past Medical History:   Diagnosis Date    ADHD     Asperger syndrome (HCC)     Retinoschisis               Past Surgical History:   Procedure Laterality Date    OTHER SURGICAL HISTORY Bilateral 2001 and 2003    Ear tubes                Social History     Socioeconomic History    Marital status: Single   Tobacco Use    Smoking status: Never    Smokeless tobacco: Never   Vaping Use    Vaping Use: Never used   Substance and Sexual Activity    Alcohol use: Yes     Alcohol/week: 0.0 standard drinks of alcohol     Comment: occ.    Drug use: Yes     Types: Cannabis     Comment: -not often    Sexual activity: Yes     Partners: Female   Other Topics Concern    Caffeine Concern Yes     Comment: rare    Exercise Yes     Comment: Daily    Special Diet No              Review of Systems   All other systems reviewed and are negative.      Positive for stated complaint: Eye irritation  Other systems are as noted in HPI.  Constitutional and vital signs reviewed.      All other systems reviewed and negative except as noted above.    Physical Exam     ED Triage Vitals [02/28/24 1608]   /77   Pulse 85   Resp 16   Temp 97.8 °F (36.6 °C)   Temp src Temporal   SpO2 96 %   O2 Device None (Room air)       Current:/77   Pulse 85   Temp 97.8 °F  (36.6 °C) (Temporal)   Resp 16   Ht 180.3 cm (5' 11\")   Wt 93.4 kg   SpO2 96%   BMI 28.73 kg/m²     Right Eye Chart Acuity: 20/70, Corrected  Left Eye Chart Acuity: 20/200, Corrected    Physical Exam  Vitals and nursing note reviewed.   Constitutional:       General: He is not in acute distress.     Appearance: He is well-developed. He is not ill-appearing or toxic-appearing.   HENT:      Right Ear: Tympanic membrane, ear canal and external ear normal.      Left Ear: Tympanic membrane, ear canal and external ear normal.      Nose: No congestion or rhinorrhea.   Eyes:      Extraocular Movements: Extraocular movements intact.      Pupils: Pupils are equal, round, and reactive to light.      Comments: Left conjunctival injection with yellow drainage to the lateral aspect with left upper eyelid erythema and swelling with tenderness.  No proptosis or pain with extraocular movements.   Cardiovascular:      Rate and Rhythm: Normal rate and regular rhythm.      Heart sounds: Normal heart sounds.   Pulmonary:      Effort: Pulmonary effort is normal.      Breath sounds: Normal breath sounds.   Skin:     General: Skin is warm and dry.   Neurological:      Mental Status: He is alert and oriented to person, place, and time.           ED Course   Labs Reviewed - No data to display                   MDM     Medical Decision Making  23-year-old with ADHD, Asperger's, and retinoschisis presents to the immediate care complaining of left eye redness, drainage, and eyelid redness and swelling for the past 2 days.  Started after he used his girlfriends eyeliner.  He states he possibly also put his glasses down during a class and they became contaminated.  He denies any visual disturbances.  He denies any significant eye pain.  There is yellow drainage.  He denies any cough or cold symptoms.  He denies any pain with eye movements.    Clinical impression: Preseptal cellulitis of left eye    Differential diagnosis: Preseptal  cellulitis of eye, orbital cellulitis, conjunctivitis    Patient will be treated with topical and oral antibiotics.  Close follow-up with ophthalmology was provided as well as ER precautions.  There are no visual disturbances.  There is purulent drainage with eyelid redness and swelling with tenderness.    Problems Addressed:  Preseptal cellulitis of left eye: acute illness or injury        Disposition and Plan     Clinical Impression:  1. Preseptal cellulitis of left eye         Disposition:  Discharge  2/28/2024  4:32 pm    Follow-up:  Beka Akhtar MD  [address] [address]  [phone]    Call             Medications Prescribed:  Discharge Medication List as of 2/28/2024  4:47 PM        START taking these medications    Details   erythromycin 5 MG/GM Ophthalmic Ointment Place 1 Application into the left eye every 6 (six) hours for 7 days., Normal, Disp-1 each, R-0      amoxicillin clavulanate 875-125 MG Oral Tab Take 1 tablet by mouth 2 (two) times daily for 10 days., Normal, Disp-20 tablet, R-0

## 2024-05-08 ENCOUNTER — TELEPHONE (OUTPATIENT)
Dept: INTERNAL MEDICINE CLINIC | Facility: CLINIC | Age: 24
End: 2024-05-08

## 2024-05-08 NOTE — TELEPHONE ENCOUNTER
SM: are you OK seeing pt?     Triaged pt:   -pt stated rash started beginning of March.   He states it is across his chest, has moved to inner arm and then up the back of his neck. Small amount on his legs.  - pinkish, raised bumps. Some are about the size of an eraser head.   No fluid/pus filled.   Pt states some feel a little rough/scabby.   They are not itchy, not irritating at all. Doesn't bother pt, family just states to have it looked at.   No recent illnesses. No fever  Pt wonders if it's from using a \"soap saver\", a mesh bag to conserve soap.     Scheduled pt sooner that June with SM.   Future Appointments   Date Time Provider Department Center   5/14/2024 11:30 AM Rochelle Alvarez APRN [facility]

## 2024-05-08 NOTE — TELEPHONE ENCOUNTER
Patient scheduled appointment for reason: Skin rash on my torso and neck     Appt is not until June    Please advise, forwarding to triage    Future Appointments   Date Time Provider Department Center   6/3/2024  1:30 PM Stefanie Bland MD [facility]

## 2024-05-14 ENCOUNTER — OFFICE VISIT (OUTPATIENT)
Dept: INTERNAL MEDICINE CLINIC | Facility: CLINIC | Age: 24
End: 2024-05-14

## 2024-05-14 VITALS
DIASTOLIC BLOOD PRESSURE: 80 MMHG | BODY MASS INDEX: 30.66 KG/M2 | TEMPERATURE: 99 F | HEIGHT: 71 IN | RESPIRATION RATE: 16 BRPM | SYSTOLIC BLOOD PRESSURE: 140 MMHG | HEART RATE: 96 BPM | WEIGHT: 219 LBS | OXYGEN SATURATION: 97 %

## 2024-05-14 DIAGNOSIS — L30.9 DERMATITIS: Primary | ICD-10-CM

## 2024-05-14 PROCEDURE — 3008F BODY MASS INDEX DOCD: CPT | Performed by: FAMILY MEDICINE

## 2024-05-14 PROCEDURE — 99213 OFFICE O/P EST LOW 20 MIN: CPT | Performed by: FAMILY MEDICINE

## 2024-05-14 PROCEDURE — 3077F SYST BP >= 140 MM HG: CPT | Performed by: FAMILY MEDICINE

## 2024-05-14 PROCEDURE — 3079F DIAST BP 80-89 MM HG: CPT | Performed by: FAMILY MEDICINE

## 2024-05-14 RX ORDER — METHYLPREDNISOLONE 4 MG/1
TABLET ORAL
Qty: 1 EACH | Refills: 0 | Status: SHIPPED | OUTPATIENT
Start: 2024-05-14

## 2024-05-14 RX ORDER — TRIAMCINOLONE ACETONIDE 1 MG/G
CREAM TOPICAL 2 TIMES DAILY PRN
Qty: 80 G | Refills: 0 | Status: SHIPPED | OUTPATIENT
Start: 2024-05-14

## 2024-05-14 NOTE — PROGRESS NOTES
CHIEF COMPLAINT:     Chief Complaint   Patient presents with    Rash     Arms, torso, and back of neck. Noticed about months ago, now just spreading. Rash on chest were the first to appear. Denies itching or burning.        HPI:   Darryl Moody is a 24 year old male .    This is a new problem. The current episode started in the 2-2.5 months. The problem has been not improving since onset. The affected locations include arms,torso, neck. The rash is characterized by red circular spots. Exposure: Pt not sure. Pt denies exposure to new soaps, detergent etc  .Pertinent negatives include no anorexia, congestion, cough, diarrhea, eye pain, facial edema, fatigue, fever, joint pain, nail changes, rhinorrhea, shortness of breath, sore throat or vomiting. Past treatments include nothing.     Current Outpatient Medications   Medication Sig Dispense Refill    buPROPion  MG Oral Tablet 24 Hr Take 1 tablet (150 mg total) by mouth daily.      Amphetamine-Dextroamphet ER 25 MG Oral Capsule SR 24 Hr Take 1 capsule (25 mg total) by mouth daily. 30 capsule 0      Past Medical History:    ADHD    Asperger syndrome (HCC)    Retinoschisis      Social History:  Social History     Socioeconomic History    Marital status: Single   Tobacco Use    Smoking status: Never    Smokeless tobacco: Never   Vaping Use    Vaping status: Never Used   Substance and Sexual Activity    Alcohol use: Yes     Alcohol/week: 0.0 standard drinks of alcohol     Comment: occ.    Drug use: Yes     Types: Cannabis     Comment: -not often    Sexual activity: Yes     Partners: Female   Other Topics Concern    Caffeine Concern Yes     Comment: rare    Exercise Yes     Comment: Daily    Special Diet No     Social Determinants of Health      Received from [facility]    Housing Stability        REVIEW OF SYSTEMS:   GENERAL: Denies fever, chills,weight change, decreased appetite  SKIN: see HPI  EYES: Denies blurred vision or double  vision  HENT: Denies congestion, rhinorrhea, sore throat or ear pain  CHEST: Denies chest pain, or palpitations  LUNGS: Denies shortness of breath, cough, or wheezing  GI: Denies abdominal pain, N/V/C/D.   MUSCULOSKELETAL: no arthralgia or swollen joints  LYMPH:  Denies lymphadenopathy  NEURO: Denies headaches or lightheadedness      EXAM:   /80 (BP Location: Left arm, Patient Position: Sitting, Cuff Size: adult)   Pulse 96   Temp 98.8 °F (37.1 °C) (Temporal)   Resp 16   Ht 5' 11\" (1.803 m)   Wt 219 lb (99.3 kg)   SpO2 97%   BMI 30.54 kg/m²   GENERAL: well developed, well nourished,in no apparent distress  SKIN: arms, back of neck,trunk, back- scattered red macular rash. Some areas dry and scaly. Non tender and non itchy  HEAD: atraumatic, normocephalic  EENT: clear  NECK: supple, non-tender  LUNGS: clear to auscultation bilaterally, no wheezes or rhonchi. Breathing is non labored.  CARDIO: RRR without murmur    Physical Exam    ASSESSMENT AND PLAN:     Encounter Diagnosis   Name Primary?    Dermatitis Yes   VS Nummular Eczema    No orders of the defined types were placed in this encounter.      Meds & Refills for this Visit:  Requested Prescriptions     Signed Prescriptions Disp Refills    triamcinolone 0.1 % External Cream 80 g 0     Sig: Apply topically 2 (two) times daily as needed.    methylPREDNISolone (MEDROL) 4 MG Oral Tablet Therapy Pack 1 each 0     Sig: As directed.       Imaging & Consults:  None    PLAN:  Pt use only unscented/gentle soaps (Cetaphil, CeraVe, Dove), topical steroid and oral steroid as prescribed.    The patient indicates understanding of these issues and agrees to the plan.  The patient is asked to call if not improving. Pt will then need to see Derm.

## 2024-05-28 ENCOUNTER — TELEPHONE (OUTPATIENT)
Dept: INTERNAL MEDICINE CLINIC | Facility: CLINIC | Age: 24
End: 2024-05-28

## 2025-02-18 ENCOUNTER — APPOINTMENT (OUTPATIENT)
Dept: GENERAL RADIOLOGY | Age: 25
End: 2025-02-18
Attending: NURSE PRACTITIONER
Payer: COMMERCIAL

## 2025-02-18 ENCOUNTER — HOSPITAL ENCOUNTER (OUTPATIENT)
Age: 25
Discharge: HOME | End: 2025-02-18
Payer: COMMERCIAL

## 2025-02-18 VITALS
BODY MASS INDEX: 34.3 KG/M2 | WEIGHT: 245 LBS | HEIGHT: 71 IN | RESPIRATION RATE: 19 BRPM | DIASTOLIC BLOOD PRESSURE: 88 MMHG | SYSTOLIC BLOOD PRESSURE: 137 MMHG | OXYGEN SATURATION: 97 % | TEMPERATURE: 98 F | HEART RATE: 100 BPM

## 2025-02-18 DIAGNOSIS — S61.451A DOG BITE, HAND, RIGHT, INITIAL ENCOUNTER: Primary | ICD-10-CM
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
## 2025-02-18 PROCEDURE — 99214 OFFICE O/P EST MOD 30 MIN: CPT

## 2025-02-18 PROCEDURE — 99213 OFFICE O/P EST LOW 20 MIN: CPT

## 2025-02-18 PROCEDURE — 73130 X-RAY EXAM OF HAND: CPT | Performed by: NURSE PRACTITIONER

## 2025-02-18 RX ORDER — SERTRALINE HYDROCHLORIDE 100 MG/1
100 TABLET, FILM COATED ORAL EVERY MORNING
COMMUNITY
Start: 2025-01-27

## 2025-02-18 NOTE — DISCHARGE INSTRUCTIONS
Dog bite:     -Acetaminophen 650 mg orally every 4 hours as needed for pain.  Do not exceed 4000 mg in 24 hours.     -Ibuprofen 600 mg orally every 6 hours as needed for pain.  Take with food.  Discontinue use and seek medical attention with blood in vomit or stool, coffee-ground vomit, or dark black stool.     -Use medications for minimal duration necessary.     -Wash bite wound(s) twice daily with wet soapy water, rinse soap, dab dry, and apply an over-the-counter antibiotic ointment ×3-5 days.     -Amoxicillin/clavulanic acid (Augmentin) twice daily times 7 days.  Take an over-the-counter probiotic 2 hours before or 2 hours after the antibiotic twice daily times 7 days.     -Follow-up with a primary care provider in 2-4 days for suture removal.     -Please monitor for and seek medical attention with redness, warmth, swelling, pus, drainage, fevers, additional injuries, or any other concerns.

## 2025-02-18 NOTE — ED INITIAL ASSESSMENT (HPI)
C/o dog bite- sustained puncture wound right 1st 2nd and 3rd fingers this morning. States Tetanus vaccine 2021. Right 2nd finger swollen, unable to bend and painful

## 2025-02-21 NOTE — ED PROVIDER NOTES
Patient Seen in: [facility] [place] [facility]      History   No chief complaint on file.    Stated Complaint: Dog Bite    Subjective:   HPI      Patient is a 24-year-old male who is here today after a dog bite.  He and his family are fostering a dog whom ended up getting into a fight with another dog at the house.  He tried to break the 2 dogs up, and ended up getting bit.  He has a puncture wound on the first, second and third digits.  His right second digit is mildly swollen.  He is unable to bend it due to pain    Objective:     Past Medical History:    ADHD    Anxiety    Asperger syndrome (HCC)    Retinoschisis              Past Surgical History:   Procedure Laterality Date    Other surgical history Bilateral 2001 and 2003    Ear tubes                Social History     Socioeconomic History    Marital status: Single   Tobacco Use    Smoking status: Never    Smokeless tobacco: Never   Vaping Use    Vaping status: Never Used   Substance and Sexual Activity    Alcohol use: Yes     Alcohol/week: 0.0 standard drinks of alcohol     Comment: occ.    Drug use: Yes     Types: Cannabis     Comment: -not often    Sexual activity: Yes     Partners: Female   Other Topics Concern    Caffeine Concern Yes     Comment: rare    Exercise Yes     Comment: Daily    Special Diet No     Social Drivers of Health      Received from [facility]    Housing Stability              Review of Systems    Positive for stated complaint: Dog Bite  Other systems are as noted in HPI.  Constitutional and vital signs reviewed.      All other systems reviewed and negative except as noted above.    Physical Exam     ED Triage Vitals [02/18/25 1630]   /88   Pulse 100   Resp 19   Temp 98.1 °F (36.7 °C)   Temp src Oral   SpO2 97 %   O2 Device None (Room air)       Current Vitals:   No data recorded    Physical Exam  VS: Vital signs reviewed. O2 saturation within normal limits for this patient     General: Patient is awake and  alert, oriented to person, place and time. Not in acute distress.      HEENT: Head is normocephalic atraumatic. Pupils reactive bilaterally.  EOMs intact.     Lungs: No respiratory distress noted    Extremities: No edema.  Pulses 2+ extremities.   Brisk capillary refill noted.      Skin: Normal skin turgor there multiple puncture wounds noted to the first, second, third digits.  There is some mild to moderate swelling of the second digit, painful to bend.  He has limited range of motion due to swelling and pain.    CNS: Moves all 4 extremities.  Interacts appropriately.  No tremor.  No gait abnormality        ED Course   Labs Reviewed - No data to display         I have personally  reviewed available prior medical records for any recent pertinent discharge summaries/testing. Patient/family updated on results and plan, a verbalized understanding and agreement with the plan.  I explained to the patient that emergent conditions may arise and to go to the ER for new, worsening or any persistent conditions. I've explained the importance of taking all medicatons as prescribed, follow up, and return precuations,  All questions answered.    Please note that this report has been produced using speech recognition software and may contain errors related to that system including, but not limited to, errors in grammar, punctuation, and spelling, as well as words and phrases that possibly may have been recognized inappropriately.  If there are any questions or concerns, contact the dictating provider for clarification.       MDM        Patient is well-appearing, well-hydrated, well-nourished, nontoxic, there is no distress noted.  Patient has multiple puncture wounds noted to the digits.  Nothing to suture at this time.  Patient's tetanus is up-to-date.  An x-ray was completed and after my independent interpretation, there are no fractures dislocations.  Patient was discharged home in stable condition on Augmentin to treat the  dog bite prophylactically.  He was instructed on following up with his primary care physician, keeping the wound clean, dry.  Return to the emergency department with any worsening symptoms or concern      Medical Decision Making      Disposition and Plan     Clinical Impression:  1. Dog bite, hand, right, initial encounter         Disposition:  Discharge  2/18/2025  5:08 pm    Follow-up:  Stefanie Bland MD  [address] [facility] [address]  [facility] [address]  [phone]                Medications Prescribed:  Discharge Medication List as of 2/18/2025  5:11 PM        START taking these medications    Details   amoxicillin clavulanate 875-125 MG Oral Tab Take 1 tablet by mouth 2 (two) times daily for 10 days., Normal, Disp-20 tablet, R-0                 Supplementary Documentation:

## (undated) NOTE — LETTER
06/03/21        [address] [address]      Dear Colin Ni,    [facility] records indicate that you have outstanding lab work and or testing that was ordered for you and has not yet been completed:  Orders Placed This En

## (undated) NOTE — LETTER
Date & Time: 2/28/2024, 4:32 PM  Patient: Darryl Moody  Encounter Provider(s):    Razia Chopra APRN       To Whom It May Concern:    Darryl Moody was seen and treated in our department on 2/28/2024. He should not return to work until 3/1/24 .    If you have any questions or concerns, please do not hesitate to call.        _______S. Keysha FNP-C______________________  Physician/APC Signature

## (undated) NOTE — ED AVS SNAPSHOT
[facility] in [address] [place] [address] Drive,[address] Floor    [address] [place] [address]    Phone:  [phone]    Fax:  [phone]           Suha Guallpa   MRN: [id number]    Department:  [facility]   Date of Visit:  3/1/2017 symptoms persist beyond 7-8 days, please return for reevaluation    Discharge References/Attachments     INFLUENZA  (ENGLISH)      Disclosure     Insurance plans vary and the physician(s) referred by the Immediate Care may not be covered by your plan.  Plea IF THERE IS ANY CHANGE OR WORSENING OF YOUR CONDITION, CALL YOUR PRIMARY CARE PHYSICIAN AT ONCE OR GO TO THE EMERGENCY DEPARTMENT.     If you have been prescribed any medication(s), please fill your prescription right away and begin taking the medication(s) Patient [address] to help you get signed up for insurance coverage. Patient [address] is a Federal Navigator program that can help with your Affordable Care Act coverage, as well as all types of Medicaid plans.   To get signed up and covere

## (undated) NOTE — MR AVS SNAPSHOT
[place]  [address] [facility] [address]  [address] [facility] [address] [phone]               Thank you for choosing us for your health care visit with Madison Pereira MD.  We are glad to serve you and happy to provide you with this sum visit, view other health information and more. To sign up or find more information on getting   Proxy Access to your child’s MyChart go to [email or web address] and click on the   Sign Up Forms link in the Additional Information box on the right.

## (undated) NOTE — LETTER
[facility] IN [place]  [address] [address]  Dept: [phone]  Dept Fax: [phone]         March 1, 2017    Patient: Mario Irving   YOB: 2000   Date of Visit: 3/1/2017       To Whom It May Concern:    Denver Howard

## (undated) NOTE — LETTER
[address], [facility] [address]  [address] [facility]              Khoi HERNANDEZO.B.: 3/4/2000        To Whom It May Concern,          The above listed patient is a patient of mine and

## (undated) NOTE — LETTER
Date: 8/27/2021    Patient Name: Terry Connolly          To Whom it may concern: The above listed patient is a patient of mine and is currently being treated for a medial condition using Adderall XR.      If you have any additional questions o

## (undated) NOTE — MR AVS SNAPSHOT
[place]  [address] [facility] [address]  [address] [facility] [address] [phone]               Thank you for choosing us for your health care visit with Carmencita Suarez MD.  We are glad to serve you and happy to provide you with this sum Bring a paper prescription for each of these medications    - Amphetamine-Dextroamphet ER 30 MG Cp24  - Amphetamine-Dextroamphet ER 30 MG Cp24            MyChart     Sign up for MyChart access for your child.   MyChart access allows you to view health info o creating a rainbow shopping list to find colorful fruits and vegetables  o go on a walking scavenger hunt through the neighborhood   o grow a family garden    In addition to 5, 4, 3, 2, 1 families can make small changes in their family routines to help e

## (undated) NOTE — LETTER
Date & Time: 2/28/2024, 4:32 PM  Patient: Darryl Moody  Encounter Provider(s):    Razia Chopra APRN       To Whom It May Concern:    Darryl Moody was seen and treated in our department on 2/28/2024. He should not return to school until 3/1/24 .    If you have any questions or concerns, please do not hesitate to call.        _______S. Keysha FNP-C______________________  Physician/APC Signature

## (undated) NOTE — LETTER
19        To whom it may concern:          Rafa Brown ( 1) is currently under my care and is on a prescription for Adderall for attention deficit disorder.        Sincerely,            Fred CREWS